# Patient Record
Sex: FEMALE | Race: WHITE | Employment: FULL TIME | URBAN - METROPOLITAN AREA
[De-identification: names, ages, dates, MRNs, and addresses within clinical notes are randomized per-mention and may not be internally consistent; named-entity substitution may affect disease eponyms.]

---

## 2017-03-21 ENCOUNTER — OFFICE VISIT (OUTPATIENT)
Dept: OBGYN CLINIC | Facility: CLINIC | Age: 52
End: 2017-03-21

## 2017-03-21 VITALS
BODY MASS INDEX: 18.61 KG/M2 | DIASTOLIC BLOOD PRESSURE: 75 MMHG | WEIGHT: 109 LBS | SYSTOLIC BLOOD PRESSURE: 109 MMHG | HEIGHT: 64 IN | HEART RATE: 84 BPM

## 2017-03-21 DIAGNOSIS — Z12.31 VISIT FOR SCREENING MAMMOGRAM: ICD-10-CM

## 2017-03-21 DIAGNOSIS — N84.1 CERVICAL POLYP: ICD-10-CM

## 2017-03-21 DIAGNOSIS — Z01.419 ENCOUNTER FOR GYNECOLOGICAL EXAMINATION WITHOUT ABNORMAL FINDING: Primary | ICD-10-CM

## 2017-03-21 DIAGNOSIS — N84.1 MUCOUS POLYP OF CERVIX: ICD-10-CM

## 2017-03-21 PROCEDURE — 88305 TISSUE EXAM BY PATHOLOGIST: CPT | Performed by: OBSTETRICS & GYNECOLOGY

## 2017-03-21 PROCEDURE — 57500 BIOPSY OF CERVIX: CPT | Performed by: OBSTETRICS & GYNECOLOGY

## 2017-03-21 PROCEDURE — 99396 PREV VISIT EST AGE 40-64: CPT | Performed by: OBSTETRICS & GYNECOLOGY

## 2017-03-21 NOTE — PROCEDURES
Polypectomy     Birth control method(s) used: condoms  Consent signed. Procedure discussed with the patient in detail including indication, risks, benefits, alternatives and complications.     Pelvic Exam Findings:  Cervix: polyp    Procedure:  Speculum pl

## 2017-03-21 NOTE — PROGRESS NOTES
Nikki Eastman is a 49year old female [id number] Patient's last menstrual period was 03/20/2017. who presents for Patient presents with:  Gyn Exam: Annual  She has no complaints. Her  was diagnosed with rectal cancer last year.   She has had 2 col file.    ALLERGIES:    Prednisone                  Comment:Other reaction(s): PREDNISONE      Review of Systems:  Constitutional:  Denies fatigue, night sweats, hot flashes  Eyes:  denies blurred or double vision  Cardiovascular:  denies chest pain or palp note  written  Uterus: normal in size, contour, position, mobility, without tenderness  Adnexa: normal without masses or tenderness  Perineum: normal  Rectovaginal: no masses, normal tone  Anus: no hemorroids    Assessment & Plan:  ASCCP guidelines discuss

## 2017-03-22 LAB — HPV I/H RISK 1 DNA SPEC QL NAA+PROBE: NEGATIVE

## 2017-04-03 NOTE — PROGRESS NOTES
Quick Note:    Neg pap, HPV neg, repeat pap needed in 3 years, return to clinic 1 year for annual exam, benign cervical polyp, call pt  ______

## 2017-04-05 ENCOUNTER — TELEPHONE (OUTPATIENT)
Dept: OBGYN CLINIC | Facility: CLINIC | Age: 52
End: 2017-04-05

## 2017-04-05 NOTE — TELEPHONE ENCOUNTER
----- Message from Morena Morales MD sent at 4/3/2017  1:57 PM CDT -----  Neg pap, HPV neg, repeat pap needed in 3 years, return to clinic 1 year for annual exam, benign cervical polyp, call pt

## 2017-04-06 NOTE — TELEPHONE ENCOUNTER
PT NOTIFIED OF ALL RESULTS AND RECS. EMPHASIZED TO BE SEEN IN 1 YEAR EVEN THOUGH PAP NOT NEEDED AT THAT TIME. PT VERBALIZED UNDERSTANDING.

## 2018-04-17 ENCOUNTER — OFFICE VISIT (OUTPATIENT)
Dept: OBGYN CLINIC | Facility: CLINIC | Age: 53
End: 2018-04-17

## 2018-04-17 VITALS
HEART RATE: 72 BPM | HEIGHT: 64.5 IN | SYSTOLIC BLOOD PRESSURE: 119 MMHG | DIASTOLIC BLOOD PRESSURE: 64 MMHG | BODY MASS INDEX: 18.38 KG/M2 | WEIGHT: 109 LBS

## 2018-04-17 DIAGNOSIS — Z12.31 VISIT FOR SCREENING MAMMOGRAM: ICD-10-CM

## 2018-04-17 DIAGNOSIS — Z01.419 ENCOUNTER FOR GYNECOLOGICAL EXAMINATION WITHOUT ABNORMAL FINDING: Primary | ICD-10-CM

## 2018-04-17 PROCEDURE — 99396 PREV VISIT EST AGE 40-64: CPT | Performed by: OBSTETRICS & GYNECOLOGY

## 2018-04-17 NOTE — PROGRESS NOTES
Nell Geiger is a 47year old female [id number] Patient's last menstrual period was 2017. who presents for Patient presents with:  Gyn Exam: ANNUAL EXAM  She has no complaints.     OBSTETRICS HISTORY:  Obstetric History     T2    L2 PREDNISONE      Review of Systems:  Constitutional:  Denies fatigue, night sweats, hot flashes  Eyes:  denies blurred or double vision  Cardiovascular:  denies chest pain or palpitations  Respiratory:  denies shortness of breath  Gastrointestinal:  denies hemorroids    Assessment & Plan:   ASCCP guidelines discussed,cotest done 2017-neg - repeat in 3 years,mammogram ordered,rtc 1 year for annual exam  Encounter for gynecological examination without abnormal finding  (primary encounter diagnosis)  Visit for

## 2019-06-04 ENCOUNTER — OFFICE VISIT (OUTPATIENT)
Dept: OBGYN CLINIC | Facility: CLINIC | Age: 54
End: 2019-06-04
Payer: COMMERCIAL

## 2019-06-04 VITALS
BODY MASS INDEX: 18 KG/M2 | WEIGHT: 107 LBS | DIASTOLIC BLOOD PRESSURE: 80 MMHG | SYSTOLIC BLOOD PRESSURE: 120 MMHG | HEART RATE: 71 BPM

## 2019-06-04 DIAGNOSIS — Z12.31 VISIT FOR SCREENING MAMMOGRAM: ICD-10-CM

## 2019-06-04 DIAGNOSIS — Z01.419 ENCOUNTER FOR GYNECOLOGICAL EXAMINATION WITHOUT ABNORMAL FINDING: Primary | ICD-10-CM

## 2019-06-04 PROBLEM — Z86.19 HISTORY OF SHINGLES: Status: ACTIVE | Noted: 2019-06-04

## 2019-06-04 PROCEDURE — 99396 PREV VISIT EST AGE 40-64: CPT | Performed by: OBSTETRICS & GYNECOLOGY

## 2019-06-04 NOTE — PROGRESS NOTES
Ty Older is a 47year old female [id number] Patient's last menstrual period was 07/01/2017. who presents for Patient presents with:  Gyn Exam: Annual.   She has no complaints. She recently had a new dx of shingles.     OBSTETRICS HISTORY:  OB Histor Talks on phone: Not on file        Gets together: Not on file        Attends Confucianist service: Not on file        Active member of club or organization: Not on file        Attends meetings of clubs or organizations: Not on file        Relationship statu itching  Musculoskeletal:  denies back pain. Skin/Breast:  Denies any breast pain, lumps, or discharge. Neurological:  denies headaches, extremity weakness or numbness. Psychiatric: denies depression or anxiety.   Endocrine:   denies excessive thirst or the defined types were placed in this encounter.     Requested Prescriptions      No prescriptions requested or ordered in this encounter     JACKIE SCREENING BILAT (CPT=77067)

## 2020-08-28 ENCOUNTER — TELEPHONE (OUTPATIENT)
Dept: OBGYN CLINIC | Facility: CLINIC | Age: 55
End: 2020-08-28

## 2020-08-28 DIAGNOSIS — Z12.31 SCREENING MAMMOGRAM, ENCOUNTER FOR: Primary | ICD-10-CM

## 2020-08-28 NOTE — TELEPHONE ENCOUNTER
Pt would like an order for a mammogram, she has an appt 10/27. Pt also see [facility] on 10-27 for annual.  Last annual 6-4-19. Last mamm was for a hawa screening mamm with morgan on 8/14/19.

## 2021-01-12 ENCOUNTER — OFFICE VISIT (OUTPATIENT)
Dept: OBGYN CLINIC | Facility: CLINIC | Age: 56
End: 2021-01-12
Payer: COMMERCIAL

## 2021-01-12 VITALS
DIASTOLIC BLOOD PRESSURE: 70 MMHG | WEIGHT: 107 LBS | SYSTOLIC BLOOD PRESSURE: 108 MMHG | BODY MASS INDEX: 18 KG/M2 | HEART RATE: 76 BPM

## 2021-01-12 DIAGNOSIS — Z01.419 ENCOUNTER FOR GYNECOLOGICAL EXAMINATION WITHOUT ABNORMAL FINDING: Primary | ICD-10-CM

## 2021-01-12 DIAGNOSIS — Z12.31 VISIT FOR SCREENING MAMMOGRAM: ICD-10-CM

## 2021-01-12 DIAGNOSIS — Z12.4 SCREENING FOR MALIGNANT NEOPLASM OF CERVIX: ICD-10-CM

## 2021-01-12 PROCEDURE — 3074F SYST BP LT 130 MM HG: CPT | Performed by: OBSTETRICS & GYNECOLOGY

## 2021-01-12 PROCEDURE — 99396 PREV VISIT EST AGE 40-64: CPT | Performed by: OBSTETRICS & GYNECOLOGY

## 2021-01-12 PROCEDURE — 3078F DIAST BP <80 MM HG: CPT | Performed by: OBSTETRICS & GYNECOLOGY

## 2021-01-12 NOTE — PROGRESS NOTES
Ty Older is a 48year old female [id number] Patient's last menstrual period was 07/01/2017. who presents for Patient presents with:  Gyn Exam: annual  She has no complaints.  Pt was asking about dexascan and since she does not have risk factors we di on file      Stress: Not on file    Relationships      Social connections        Talks on phone: Not on file        Gets together: Not on file        Attends Sabianism service: Not on file        Active member of club or organization: Not on file        At constipation  Genitourinary:  denies dysuria, incontinence, abnormal vaginal discharge, vaginal itching  Musculoskeletal:  denies back pain. Skin/Breast:  Denies any breast pain, lumps, or discharge.    Neurological:  denies headaches, extremity weakness o examination without abnormal finding  (primary encounter diagnosis)  No orders of the defined types were placed in this encounter.     Requested Prescriptions      No prescriptions requested or ordered in this encounter     None

## 2021-01-14 LAB — HPV I/H RISK 1 DNA SPEC QL NAA+PROBE: NEGATIVE

## 2022-02-01 ENCOUNTER — TELEPHONE (OUTPATIENT)
Dept: OBGYN CLINIC | Facility: CLINIC | Age: 57
End: 2022-02-01

## 2022-03-14 ENCOUNTER — TELEPHONE (OUTPATIENT)
Dept: OBGYN CLINIC | Facility: CLINIC | Age: 57
End: 2022-03-14

## 2022-03-14 NOTE — TELEPHONE ENCOUNTER
Notified patient of normal results of mammogram- radiology recommends repeat mammo in 12 months. Patient notes that report states: \"Breast Density D:  The breast is extremely dense which limits evaluation and could obscure a lesion. \"  She states she would be more comfortable if additional imaging was done. To [facility]- please advise on additional breast imaging.

## 2022-03-14 NOTE — TELEPHONE ENCOUNTER
Whole breast US ordered. Pt provided with CPT code 35458 to check coverage with insurance. Pt verbalized understanding.

## 2022-03-14 NOTE — TELEPHONE ENCOUNTER
Ok to order a whole breast US for the patient for diagnosis of dense breasts. Please ask her to call insurance to make sure this is covered or at least to find out what she is responsible for.

## 2022-04-04 ENCOUNTER — TELEPHONE (OUTPATIENT)
Dept: OBGYN CLINIC | Facility: CLINIC | Age: 57
End: 2022-04-04

## 2022-04-04 NOTE — TELEPHONE ENCOUNTER
Pt states that her left breast has numbness that is intermittent. She had it 3 weeks ago and they it went away. It came back on Friday. Pt states that the numbness is top of the breast.  Denies nipple discharge, pain, fever or any lumps. Pt would like to be seen. Sent to [facility] I we could use an appt on 4/4 at 5pm, a MD appt or  NP on 4/6/22 at 820am.    Senrt to [facility] for recs.

## 2022-04-07 ENCOUNTER — OFFICE VISIT (OUTPATIENT)
Dept: OBGYN CLINIC | Facility: CLINIC | Age: 57
End: 2022-04-07
Payer: COMMERCIAL

## 2022-04-07 VITALS
SYSTOLIC BLOOD PRESSURE: 116 MMHG | BODY MASS INDEX: 19 KG/M2 | DIASTOLIC BLOOD PRESSURE: 69 MMHG | WEIGHT: 108.63 LBS | HEART RATE: 64 BPM

## 2022-04-07 DIAGNOSIS — N64.59 BREAST COMPLAINT: Primary | ICD-10-CM

## 2022-04-07 PROCEDURE — 3074F SYST BP LT 130 MM HG: CPT | Performed by: CLINICAL NURSE SPECIALIST

## 2022-04-07 PROCEDURE — 3078F DIAST BP <80 MM HG: CPT | Performed by: CLINICAL NURSE SPECIALIST

## 2022-04-07 PROCEDURE — 99213 OFFICE O/P EST LOW 20 MIN: CPT | Performed by: CLINICAL NURSE SPECIALIST

## 2022-05-24 ENCOUNTER — OFFICE VISIT (OUTPATIENT)
Dept: OBGYN CLINIC | Facility: CLINIC | Age: 57
End: 2022-05-24
Payer: COMMERCIAL

## 2022-05-24 VITALS
HEART RATE: 83 BPM | BODY MASS INDEX: 19 KG/M2 | WEIGHT: 109.63 LBS | SYSTOLIC BLOOD PRESSURE: 118 MMHG | DIASTOLIC BLOOD PRESSURE: 77 MMHG

## 2022-05-24 DIAGNOSIS — Z01.419 ENCOUNTER FOR GYNECOLOGICAL EXAMINATION WITHOUT ABNORMAL FINDING: Primary | ICD-10-CM

## 2022-05-24 PROCEDURE — 3074F SYST BP LT 130 MM HG: CPT | Performed by: OBSTETRICS & GYNECOLOGY

## 2022-05-24 PROCEDURE — 3078F DIAST BP <80 MM HG: CPT | Performed by: OBSTETRICS & GYNECOLOGY

## 2022-05-24 PROCEDURE — 99396 PREV VISIT EST AGE 40-64: CPT | Performed by: OBSTETRICS & GYNECOLOGY

## 2023-03-21 ENCOUNTER — TELEPHONE (OUTPATIENT)
Dept: OBGYN CLINIC | Facility: CLINIC | Age: 58
End: 2023-03-21

## 2023-03-21 DIAGNOSIS — Z12.31 ENCOUNTER FOR SCREENING MAMMOGRAM FOR MALIGNANT NEOPLASM OF BREAST: Primary | ICD-10-CM

## 2023-03-21 NOTE — TELEPHONE ENCOUNTER
5/24/22 last annual w/[facility]  Pap: UTD  Last Mammo: 3/12/2022 CAT 2-negative  No future appt scheduled for annual    Attempted to help pt schedule Annual, states she will look at her work calender and schedule via [facility] [address]. Informed order for Mammo was placed.

## 2024-06-28 NOTE — ADDENDUM NOTE
Addended by: Sal Szymanski on: 1/12/2021 03:28 PM     Modules accepted: Orders Message left to read her messages from [facility]

## (undated) DIAGNOSIS — R92.2 DENSE BREAST: Primary | ICD-10-CM

## (undated) DIAGNOSIS — Z12.31 ENCOUNTER FOR SCREENING MAMMOGRAM FOR MALIGNANT NEOPLASM OF BREAST: Primary | ICD-10-CM

## (undated) NOTE — MR AVS SNAPSHOT
After Visit Summary   1/12/2021    Shahnaz Hodge    MRN: [id number]           Visit Information     Date & Time  1/12/2021  3:00 PM Provider  Delia Armstrong MD [address] [place] [address] Dept.  Phone  [phone] Instructions:  To schedule a test at any [facility], call Central Scheduling at [phone], Monday through Friday between 7:30am to 6pm and on Saturday between 8am and 1pm. Evening and weekend appointments for your exam are av a treatment plan within a few hours.  ONLINE VISIT  Primary Care Providers  Treatment for mild illness or injury that does not require immediate attention VIDEO VISITS  Average cost  $35*    e-VISTS  Average cost  $35*     SAME DAY APPOINTMENTS   Available

## (undated) NOTE — Clinical Note
AUTHORIZATION FOR SURGICAL OPERATION OR OTHER PROCEDURE    1.  I hereby [facility] Dr. Sydni Robert, [facility] assigned to my case to perform the following operation and/or procedure at the [facility]:    Cervical Polypectomy      2 Relationship to Patient:             Parent    Responsible person                            Spouse  In case of minor or                     Other  _____________   Incompetent name:  __________________________________________________

## (undated) NOTE — MR AVS SNAPSHOT
[facility]  [address] [place] [address], [address] [place] [address]  [phone]               Thank you for choosing us for your health care visit with Kelly Rae.  MD Rachelle.  We are glad to serve you and happy to provide you with this summary of You have not been prescribed any medications. Results of Recent Testing     BIOPSY/EXCIS CERVICAL MARY JANE Santoyo     Sign up for [employer], your secure online medical record.   [employer] will allow you to access patient instructions from y

## (undated) NOTE — MR AVS SNAPSHOT
After Visit Summary   3/21/2017    Bre Garcia    MRN: [id number]           Visit Information        Provider Department Dept Phone    3/21/2017  1:40 PM Al Bush MD [facility] [phone]      Your Vitals Were     BP Pulse Ht Wt messages to your doctor, view test results, renew prescriptions and request appointments. How Do I Sign Up? In your Internet browser, go to [email or web address]    Click on the Activate your Account if you have an activation code i experience and are looking for ways to make improvements. Your feedback will help us do so. For more information on [employer], please visit [email or web address]